# Patient Record
Sex: MALE | Race: WHITE
[De-identification: names, ages, dates, MRNs, and addresses within clinical notes are randomized per-mention and may not be internally consistent; named-entity substitution may affect disease eponyms.]

---

## 2017-05-01 ENCOUNTER — HOSPITAL ENCOUNTER (INPATIENT)
Dept: HOSPITAL 84 - D.MS | Age: 72
LOS: 2 days | Discharge: HOME | DRG: 39 | End: 2017-05-03
Attending: FAMILY MEDICINE | Admitting: FAMILY MEDICINE
Payer: MEDICARE

## 2017-05-01 VITALS
WEIGHT: 217 LBS | HEIGHT: 71 IN | WEIGHT: 217 LBS | BODY MASS INDEX: 30.38 KG/M2 | HEIGHT: 71 IN | BODY MASS INDEX: 30.38 KG/M2

## 2017-05-01 VITALS — DIASTOLIC BLOOD PRESSURE: 74 MMHG | SYSTOLIC BLOOD PRESSURE: 117 MMHG

## 2017-05-01 VITALS — DIASTOLIC BLOOD PRESSURE: 76 MMHG | SYSTOLIC BLOOD PRESSURE: 132 MMHG

## 2017-05-01 DIAGNOSIS — I10: ICD-10-CM

## 2017-05-01 DIAGNOSIS — E11.9: ICD-10-CM

## 2017-05-01 DIAGNOSIS — I25.119: ICD-10-CM

## 2017-05-01 DIAGNOSIS — Z86.73: ICD-10-CM

## 2017-05-01 DIAGNOSIS — I65.23: Primary | ICD-10-CM

## 2017-05-01 DIAGNOSIS — E78.5: ICD-10-CM

## 2017-05-01 LAB
ALBUMIN SERPL-MCNC: 4.3 G/DL (ref 3.4–5)
ALP SERPL-CCNC: 75 U/L (ref 46–116)
ALT SERPL-CCNC: 36 U/L (ref 10–68)
ANION GAP SERPL CALC-SCNC: 16.8 MMOL/L (ref 8–16)
APPEARANCE UR: CLEAR
BASOPHILS NFR BLD AUTO: 0.2 % (ref 0–2)
BILIRUB SERPL-MCNC: 0.43 MG/DL (ref 0.2–1.3)
BILIRUB SERPL-MCNC: NEGATIVE MG/DL
BUN SERPL-MCNC: 19 MG/DL (ref 7–18)
CALCIUM SERPL-MCNC: 9.6 MG/DL (ref 8.5–10.1)
CHLORIDE SERPL-SCNC: 103 MMOL/L (ref 98–107)
CO2 SERPL-SCNC: 24.3 MMOL/L (ref 21–32)
COLOR UR: YELLOW
CREAT SERPL-MCNC: 1 MG/DL (ref 0.6–1.3)
EOSINOPHIL NFR BLD: 2.9 % (ref 0–7)
ERYTHROCYTE [DISTWIDTH] IN BLOOD BY AUTOMATED COUNT: 13.1 % (ref 11.5–14.5)
GLOBULIN SER-MCNC: 2.9 G/L
GLUCOSE SERPL-MCNC: 1000 MG/DL
GLUCOSE SERPL-MCNC: 80 MG/DL (ref 74–106)
HCT VFR BLD CALC: 43.6 % (ref 42–54)
HGB BLD-MCNC: 14.6 G/DL (ref 13.5–17.5)
IMM GRANULOCYTES NFR BLD: 0.4 % (ref 0–5)
INR PPP: 0.96 (ref 0.85–1.17)
KETONES UR STRIP-MCNC: NEGATIVE MG/DL
LEUKOCYTE ESTERASE: NEGATIVE
LYMPHOCYTES NFR BLD AUTO: 23.9 % (ref 15–50)
MCH RBC QN AUTO: 31.1 PG (ref 26–34)
MCHC RBC AUTO-ENTMCNC: 33.5 G/DL (ref 31–37)
MCV RBC: 92.8 FL (ref 80–100)
MONOCYTES NFR BLD: 7.1 % (ref 2–11)
NEUTROPHILS NFR BLD AUTO: 65.5 % (ref 40–80)
NITRITE UR-MCNC: NEGATIVE MG/ML
OSMOLALITY SERPL CALC.SUM OF ELEC: 279 MOSM/KG (ref 275–300)
PH UR STRIP: 5 [PH] (ref 5–6)
PLATELET # BLD: 209 10X3/UL (ref 130–400)
PMV BLD AUTO: 10.1 FL (ref 7.4–10.4)
POTASSIUM SERPL-SCNC: 4.1 MMOL/L (ref 3.5–5.1)
PROT SERPL-MCNC: 7.2 G/DL (ref 6.4–8.2)
PROT UR-MCNC: NEGATIVE MG/DL
PROTHROMBIN TIME: 12.6 SECONDS (ref 11.6–15)
RBC # BLD AUTO: 4.7 10X6/UL (ref 4.2–6.1)
SODIUM SERPL-SCNC: 140 MMOL/L (ref 136–145)
SP GR UR STRIP: 1.01 (ref 1–1.02)
UROBILINOGEN UR-MCNC: NORMAL MG/DL
WBC # BLD AUTO: 10.2 10X3/UL (ref 4.8–10.8)

## 2017-05-01 NOTE — HP
PATIENT: LINDEN PRECIADO                                MEDICAL RECORD: [id number]
ACCOUNT: [id number]                                    LOCATION:D.MS [id number]
: 45                                            ADMISSION DATE: 17
                                                         
 
                             HISTORY AND PHYSICAL EXAMINATION
 
 
CHIEF COMPLAINT:  Slurred speech.
 
HISTORY OF PRESENT ILLNESS:  A 72-year-old white male patient of mine, who
presents for followup of recent TIA.  He has had a few of them now, most recent
one lasted only a few seconds a couple days ago in which he was talking to his
family and then all of a sudden, just could not talk and could not communicate
at all.  No facial drooping at that time; however, he has had it on previous
episodes.  We did an ultrasound of his carotid arteries last week, which showed
occlusion of the left internal carotid artery and some blockage in the right
external carotid artery.  At that time, we will order a CTA and a referral to CT
surgery; however, since he has had another episode over the weekend, we will
admit him today directly to the hospital.
 
REVIEW OF SYSTEMS:
CONSTITUTIONAL:  No fever or chills.
CARDIOVASCULAR:  No chest pain.
RESPIRATORY:  No cough or wheeze.
GASTROINTESTINAL:  No nausea, vomiting or diarrhea.
GENITOURINARY:  No dysuria.
NEUROLOGIC:  See HPI.
 
PAST MEDICAL HISTORY:
1.  Coronary artery disease.
2.  Hyperlipidemia.
3.  Hypertension.
4.  Type 2 diabetes.
5.  TIA.
 
PAST SURGICAL HISTORY:  Multiple rotator cuff surgeries on both shoulders, knee
surgery and coronary artery stent, last one in .
 
SOCIAL HISTORY:  The patient is , has 3 children, lives in [facility].  He is a  for large "[employer]s.  Nonsmoker.  Drinks alcohol
occasionally.
 
ALLERGIES:  PENICILLIN.
 
MEDICATIONS:  Glipizide 10 mg twice a day, atorvastatin 80 mg at night, Invokana
300 mg a day, metformin 1000 mg twice a day, Toprol-XL 50 mg a day, Ranexa 500
mg twice a day, valsartan with hydrochlorothiazide 160/25 daily, isosorbide
mononitrate 30 mg daily, aspirin 81 mg a day, Victoza as directed, and Plavix 75
mg a day.
 
PHYSICAL EXAMINATION:
VITAL SIGNS:  Height 5 feet 11 inches, weight 228.  Temperature 98.1, pulse 79,
O2 sat 94% on room air.
GENERAL:  No acute distress.
HEENT:  Normocephalic, atraumatic.
NECK:  Supple.
LUNGS:  Clear to auscultation bilaterally.
 
 
 
HISTORY AND PHYSICAL                           [id number]    LINDEN PRECIADO        
 
 
CARDIOVASCULAR:  Regular rate and rhythm.
GASTROINTESTINAL:  Soft, nontender to palpation.
NEUROLOGIC:  Awake, alert, oriented times 3.  Cranial nerves II-XII grossly
intact.
 
ASSESSMENT:
1.  Transient ischemic attack.
2.  Carotid artery stenosis.
3.  Hypertension.
4.  Coronary artery disease.
5.  Non-insulin-dependent diabetes.
6.  History of benign cerebral meningioma.
 
PLAN:  We will admit the patient to the hospital.  Get CTA of the carotids and
consult  ____.
 
TRANSINT:[id number] Voice Confirmation ID: [id number] DOCUMENT ID: [id number]
 
 
                                           
                                           BK JOHNSON MD          
 
 
 
 
 
 
 
 
 
 
 
 
 
 
 
 
 
 
 
 
 
 
 
 
 
 
CC:                                                             [id number]
DICTATION DATE: 17 1431     :     17 1529      ADM IN  
                                                                              
[address] [place] [address]

## 2017-05-01 NOTE — OP
PATIENT NAME:  LINDEN PRECIADO                          MEDICAL RECORD: [id number]
:45                                             LOCATION:[facility]    D.CV08
                                                         ADMISSION DATE:17
SURGEON:  LUIS FERNANDO PENNINGTON MD             
 
 
DATE OF OPERATION:  2017
 
SURGEON:  Luis Fernando Pennington MD.
 
ANESTHESIA:  General endotracheal, Dr. Bean.
 
OPERATION PERFORMED:  Left carotid endarterectomy with patch angioplasty.
 
PREOPERATIVE DIAGNOSIS:  Critical left internal carotid artery stenosis.
 
POSTOPERATIVE DIAGNOSIS:  Critical left internal carotid artery stenosis.
 
INDICATION FOR OPERATION:  Critical left internal carotid artery stenosis.
 
FINDINGS AT OPERATION:  Critical left internal carotid artery stenosis.  There
were no EEG changes with clamping or unclamping of the carotid artery.
 
ESTIMATED BLOOD LOSS:  Less than 100 mL.
 
DESCRIPTION OF PROCEDURE:  After informed consent, adequate preoperative
medication and evaluation, the patient was brought to the operating room, placed
on table in supine position.  After induction of general endotracheal anesthesia
and application of appropriate monitoring devices, the left neck was prepped and
draped in a sterile field, utilizing Betadine scrub, alcohol, and Betadine
solution.  A Betadine-impregnated drape was also used.  An oblique incision was
made in the skin crease.  Dissection was carried down the fascia.  Hemostasis
maintained with electrocautery.  Facial vein was identified and divided. 
Utilizing sharp dissection, the common carotid, internal and external carotid
arteries were dissected free from surrounding structures, protecting the
neurological structures.  The patient was given a calculated dose of heparin. 
After 3 minutes, clamps were applied.  After 2 minutes, no EEG changes.  The
arteriotomy was made and extended with Pompa scissors.  Artery underwent
endarterectomy sharply.  Artery underwent extensive debridement and irrigation. 
Utilizing a vascular patch and running 7-0 Prolene suture, the arteriotomy was
closed with patch angioplasty technique.  All maneuvers to remove trapped air
were performed.  The clamps were removed sequentially.  There were no EEG
changes.  The patient was given a calculated dose of protamine to reverse the
heparin.  Hemostasis was achieved.  A #7 Vikas-Guevara drain was left in the
depth of wound and brought to the base of the neck.  Neck was again irrigated. 
Instrument count and sponge count were correct times 2.  Neck was closed in
layers utilizing 3-0 Vicryl on the platysma, 5-0 subcuticular Monocryl on the
skin.  Sterile dressings were applied.  The patient tolerated the procedure well
and was transferred to the CV ICU in satisfactory condition.
 
TRANSINT:[id number] Voice Confirmation ID: [id number] DOCUMENT ID: [id number]
                                           
 
 
 
OPERATIVE REPORT                               [id number]    LINDEN PRECIADO EDWARD MD             
 
 
 
 
 
 
 
 
 
 
 
 
 
 
 
 
 
 
 
 
 
 
 
 
 
 
 
 
 
 
 
 
 
 
 
 
 
 
 
 
 
 
 
 
 
 
CC:                                                             [id number]
DICTATION DATE: 17 1045     :     17 1114      ADM IN  
                                                                              
[id number] [place] [address]

## 2017-05-02 VITALS — DIASTOLIC BLOOD PRESSURE: 49 MMHG | SYSTOLIC BLOOD PRESSURE: 115 MMHG

## 2017-05-02 VITALS — SYSTOLIC BLOOD PRESSURE: 111 MMHG | DIASTOLIC BLOOD PRESSURE: 48 MMHG

## 2017-05-02 VITALS — DIASTOLIC BLOOD PRESSURE: 60 MMHG | SYSTOLIC BLOOD PRESSURE: 129 MMHG

## 2017-05-02 VITALS — SYSTOLIC BLOOD PRESSURE: 122 MMHG | DIASTOLIC BLOOD PRESSURE: 55 MMHG

## 2017-05-02 VITALS — DIASTOLIC BLOOD PRESSURE: 59 MMHG | SYSTOLIC BLOOD PRESSURE: 124 MMHG

## 2017-05-02 VITALS — DIASTOLIC BLOOD PRESSURE: 51 MMHG | SYSTOLIC BLOOD PRESSURE: 122 MMHG

## 2017-05-02 VITALS — DIASTOLIC BLOOD PRESSURE: 81 MMHG | SYSTOLIC BLOOD PRESSURE: 160 MMHG

## 2017-05-02 VITALS — SYSTOLIC BLOOD PRESSURE: 116 MMHG | DIASTOLIC BLOOD PRESSURE: 51 MMHG

## 2017-05-02 VITALS — DIASTOLIC BLOOD PRESSURE: 59 MMHG | SYSTOLIC BLOOD PRESSURE: 132 MMHG

## 2017-05-02 VITALS — SYSTOLIC BLOOD PRESSURE: 131 MMHG | DIASTOLIC BLOOD PRESSURE: 57 MMHG

## 2017-05-02 VITALS — DIASTOLIC BLOOD PRESSURE: 51 MMHG | SYSTOLIC BLOOD PRESSURE: 118 MMHG

## 2017-05-02 VITALS — SYSTOLIC BLOOD PRESSURE: 112 MMHG | DIASTOLIC BLOOD PRESSURE: 49 MMHG

## 2017-05-02 VITALS — SYSTOLIC BLOOD PRESSURE: 135 MMHG | DIASTOLIC BLOOD PRESSURE: 55 MMHG

## 2017-05-02 VITALS — SYSTOLIC BLOOD PRESSURE: 130 MMHG | DIASTOLIC BLOOD PRESSURE: 53 MMHG

## 2017-05-02 VITALS — DIASTOLIC BLOOD PRESSURE: 56 MMHG | SYSTOLIC BLOOD PRESSURE: 103 MMHG

## 2017-05-02 VITALS — SYSTOLIC BLOOD PRESSURE: 139 MMHG | DIASTOLIC BLOOD PRESSURE: 55 MMHG

## 2017-05-02 VITALS — SYSTOLIC BLOOD PRESSURE: 133 MMHG | DIASTOLIC BLOOD PRESSURE: 63 MMHG

## 2017-05-02 VITALS — SYSTOLIC BLOOD PRESSURE: 147 MMHG | DIASTOLIC BLOOD PRESSURE: 62 MMHG

## 2017-05-02 VITALS — SYSTOLIC BLOOD PRESSURE: 113 MMHG | DIASTOLIC BLOOD PRESSURE: 55 MMHG

## 2017-05-02 VITALS — DIASTOLIC BLOOD PRESSURE: 50 MMHG | SYSTOLIC BLOOD PRESSURE: 117 MMHG

## 2017-05-02 VITALS — DIASTOLIC BLOOD PRESSURE: 53 MMHG | SYSTOLIC BLOOD PRESSURE: 122 MMHG

## 2017-05-02 VITALS — SYSTOLIC BLOOD PRESSURE: 126 MMHG | DIASTOLIC BLOOD PRESSURE: 52 MMHG

## 2017-05-02 VITALS — SYSTOLIC BLOOD PRESSURE: 121 MMHG | DIASTOLIC BLOOD PRESSURE: 50 MMHG

## 2017-05-02 VITALS — DIASTOLIC BLOOD PRESSURE: 55 MMHG | SYSTOLIC BLOOD PRESSURE: 130 MMHG

## 2017-05-02 VITALS — DIASTOLIC BLOOD PRESSURE: 59 MMHG | SYSTOLIC BLOOD PRESSURE: 122 MMHG

## 2017-05-02 VITALS — SYSTOLIC BLOOD PRESSURE: 125 MMHG | DIASTOLIC BLOOD PRESSURE: 53 MMHG

## 2017-05-02 VITALS — SYSTOLIC BLOOD PRESSURE: 124 MMHG | DIASTOLIC BLOOD PRESSURE: 51 MMHG

## 2017-05-02 VITALS — SYSTOLIC BLOOD PRESSURE: 125 MMHG | DIASTOLIC BLOOD PRESSURE: 54 MMHG

## 2017-05-02 VITALS — SYSTOLIC BLOOD PRESSURE: 122 MMHG | DIASTOLIC BLOOD PRESSURE: 56 MMHG

## 2017-05-02 VITALS — DIASTOLIC BLOOD PRESSURE: 60 MMHG | SYSTOLIC BLOOD PRESSURE: 123 MMHG

## 2017-05-02 VITALS — SYSTOLIC BLOOD PRESSURE: 127 MMHG | DIASTOLIC BLOOD PRESSURE: 53 MMHG

## 2017-05-02 VITALS — SYSTOLIC BLOOD PRESSURE: 128 MMHG | DIASTOLIC BLOOD PRESSURE: 59 MMHG

## 2017-05-02 VITALS — DIASTOLIC BLOOD PRESSURE: 50 MMHG | SYSTOLIC BLOOD PRESSURE: 121 MMHG

## 2017-05-02 VITALS — SYSTOLIC BLOOD PRESSURE: 130 MMHG | DIASTOLIC BLOOD PRESSURE: 59 MMHG

## 2017-05-02 VITALS — DIASTOLIC BLOOD PRESSURE: 50 MMHG | SYSTOLIC BLOOD PRESSURE: 119 MMHG

## 2017-05-02 VITALS — SYSTOLIC BLOOD PRESSURE: 124 MMHG | DIASTOLIC BLOOD PRESSURE: 57 MMHG

## 2017-05-02 LAB
ALBUMIN SERPL-MCNC: 3.7 G/DL (ref 3.4–5)
ALP SERPL-CCNC: 58 U/L (ref 46–116)
ALT SERPL-CCNC: 37 U/L (ref 10–68)
ANION GAP SERPL CALC-SCNC: 14.8 MMOL/L (ref 8–16)
BASOPHILS NFR BLD AUTO: 0.1 % (ref 0–2)
BILIRUB SERPL-MCNC: 0.5 MG/DL (ref 0.2–1.3)
BUN SERPL-MCNC: 15 MG/DL (ref 7–18)
CALCIUM SERPL-MCNC: 8.8 MG/DL (ref 8.5–10.1)
CHLORIDE SERPL-SCNC: 103 MMOL/L (ref 98–107)
CHOLEST/HDLC SERPL: 3.9 RATIO (ref 2.3–4.9)
CO2 SERPL-SCNC: 25.4 MMOL/L (ref 21–32)
CREAT SERPL-MCNC: 0.9 MG/DL (ref 0.6–1.3)
EOSINOPHIL NFR BLD: 3.5 % (ref 0–7)
ERYTHROCYTE [DISTWIDTH] IN BLOOD BY AUTOMATED COUNT: 13.2 % (ref 11.5–14.5)
GLOBULIN SER-MCNC: 2.9 G/L
GLUCOSE SERPL-MCNC: 158 MG/DL (ref 74–106)
HCT VFR BLD CALC: 42.4 % (ref 42–54)
HDLC SERPL-MCNC: 33 MG/DL (ref 32–96)
HGB BLD-MCNC: 14.1 G/DL (ref 13.5–17.5)
IMM GRANULOCYTES NFR BLD: 0.4 % (ref 0–5)
LDLC SERPL-MCNC: (no result) MG/DL (ref 0–100)
LYMPHOCYTES NFR BLD AUTO: 22.1 % (ref 15–50)
MCH RBC QN AUTO: 30.9 PG (ref 26–34)
MCHC RBC AUTO-ENTMCNC: 33.3 G/DL (ref 31–37)
MCV RBC: 92.8 FL (ref 80–100)
MONOCYTES NFR BLD: 5.8 % (ref 2–11)
NEUTROPHILS NFR BLD AUTO: 68.1 % (ref 40–80)
OSMOLALITY SERPL CALC.SUM OF ELEC: 281 MOSM/KG (ref 275–300)
PLATELET # BLD: 181 10X3/UL (ref 130–400)
PMV BLD AUTO: 10.1 FL (ref 7.4–10.4)
POTASSIUM SERPL-SCNC: 4.2 MMOL/L (ref 3.5–5.1)
PROT SERPL-MCNC: 6.6 G/DL (ref 6.4–8.2)
RBC # BLD AUTO: 4.57 10X6/UL (ref 4.2–6.1)
SODIUM SERPL-SCNC: 139 MMOL/L (ref 136–145)
TRIGL SERPL-MCNC: 519 MG/DL (ref 30–200)
WBC # BLD AUTO: 7.4 10X3/UL (ref 4.8–10.8)

## 2017-05-02 PROCEDURE — 03UL0KZ SUPPLEMENT LEFT INTERNAL CAROTID ARTERY WITH NONAUTOLOGOUS TISSUE SUBSTITUTE, OPEN APPROACH: ICD-10-PCS | Performed by: THORACIC SURGERY (CARDIOTHORACIC VASCULAR SURGERY)

## 2017-05-02 PROCEDURE — 03CL0ZZ EXTIRPATION OF MATTER FROM LEFT INTERNAL CAROTID ARTERY, OPEN APPROACH: ICD-10-PCS | Performed by: THORACIC SURGERY (CARDIOTHORACIC VASCULAR SURGERY)

## 2017-05-02 NOTE — NUR
REPORT RECEIVED AND ASSESSMENT COMPLETED. SEE FLOWSHEET FOR FULL DETAILS. PT
DENIES PAIN, AND HAS NO SIGNS OF SWELLING OR HEMATOMA. VSS. WILL MONITOR FOR
CHANGES THROUGHOUT SHIFT

## 2017-05-02 NOTE — NUR
PLACED FRESH ICE IN ICE PACK TO PLACE OVER INSICION SITE. PT DENIES NEEDS AT
THIS TIME. CALL LIGHT IN REACH. BED IN LOW POSITION. WILL CONT TO ASSESS.

## 2017-05-02 NOTE — NUR
PATIENT RESTING IN BED WITH EYES CLOSED. NO VISBLE SIGNS OF DISTRESS. BED IN
LOWEST POSITION AND CALL LIGHT WITHIN REACH.

## 2017-05-03 VITALS — SYSTOLIC BLOOD PRESSURE: 102 MMHG | DIASTOLIC BLOOD PRESSURE: 68 MMHG

## 2017-05-03 VITALS — DIASTOLIC BLOOD PRESSURE: 56 MMHG | SYSTOLIC BLOOD PRESSURE: 129 MMHG

## 2017-05-03 VITALS — SYSTOLIC BLOOD PRESSURE: 102 MMHG | DIASTOLIC BLOOD PRESSURE: 64 MMHG

## 2017-05-03 VITALS — DIASTOLIC BLOOD PRESSURE: 51 MMHG | SYSTOLIC BLOOD PRESSURE: 115 MMHG

## 2017-05-03 VITALS — SYSTOLIC BLOOD PRESSURE: 135 MMHG | DIASTOLIC BLOOD PRESSURE: 56 MMHG

## 2017-05-03 VITALS — SYSTOLIC BLOOD PRESSURE: 142 MMHG | DIASTOLIC BLOOD PRESSURE: 54 MMHG

## 2017-05-03 VITALS — DIASTOLIC BLOOD PRESSURE: 62 MMHG | SYSTOLIC BLOOD PRESSURE: 101 MMHG

## 2017-05-03 VITALS — DIASTOLIC BLOOD PRESSURE: 50 MMHG | SYSTOLIC BLOOD PRESSURE: 121 MMHG

## 2017-05-03 VITALS — DIASTOLIC BLOOD PRESSURE: 53 MMHG | SYSTOLIC BLOOD PRESSURE: 121 MMHG

## 2017-05-03 VITALS — SYSTOLIC BLOOD PRESSURE: 111 MMHG | DIASTOLIC BLOOD PRESSURE: 48 MMHG

## 2017-05-03 VITALS — SYSTOLIC BLOOD PRESSURE: 114 MMHG | DIASTOLIC BLOOD PRESSURE: 51 MMHG

## 2017-05-03 VITALS — SYSTOLIC BLOOD PRESSURE: 131 MMHG | DIASTOLIC BLOOD PRESSURE: 51 MMHG

## 2017-05-03 VITALS — SYSTOLIC BLOOD PRESSURE: 106 MMHG | DIASTOLIC BLOOD PRESSURE: 47 MMHG

## 2017-05-03 VITALS — SYSTOLIC BLOOD PRESSURE: 140 MMHG | DIASTOLIC BLOOD PRESSURE: 56 MMHG

## 2017-05-03 VITALS — DIASTOLIC BLOOD PRESSURE: 47 MMHG | SYSTOLIC BLOOD PRESSURE: 98 MMHG

## 2017-05-03 VITALS — SYSTOLIC BLOOD PRESSURE: 129 MMHG | DIASTOLIC BLOOD PRESSURE: 54 MMHG

## 2017-05-03 VITALS — DIASTOLIC BLOOD PRESSURE: 52 MMHG | SYSTOLIC BLOOD PRESSURE: 128 MMHG

## 2017-05-03 VITALS — DIASTOLIC BLOOD PRESSURE: 54 MMHG | SYSTOLIC BLOOD PRESSURE: 132 MMHG

## 2017-05-03 VITALS — DIASTOLIC BLOOD PRESSURE: 57 MMHG | SYSTOLIC BLOOD PRESSURE: 134 MMHG

## 2017-05-03 VITALS — SYSTOLIC BLOOD PRESSURE: 128 MMHG | DIASTOLIC BLOOD PRESSURE: 51 MMHG

## 2017-05-03 VITALS — DIASTOLIC BLOOD PRESSURE: 54 MMHG | SYSTOLIC BLOOD PRESSURE: 124 MMHG

## 2017-05-03 VITALS — SYSTOLIC BLOOD PRESSURE: 134 MMHG | DIASTOLIC BLOOD PRESSURE: 55 MMHG

## 2017-05-03 VITALS — SYSTOLIC BLOOD PRESSURE: 123 MMHG | DIASTOLIC BLOOD PRESSURE: 51 MMHG

## 2017-05-03 VITALS — SYSTOLIC BLOOD PRESSURE: 121 MMHG | DIASTOLIC BLOOD PRESSURE: 50 MMHG

## 2017-05-03 VITALS — DIASTOLIC BLOOD PRESSURE: 54 MMHG | SYSTOLIC BLOOD PRESSURE: 129 MMHG

## 2017-05-03 VITALS — DIASTOLIC BLOOD PRESSURE: 65 MMHG | SYSTOLIC BLOOD PRESSURE: 147 MMHG

## 2017-05-03 VITALS — SYSTOLIC BLOOD PRESSURE: 137 MMHG | DIASTOLIC BLOOD PRESSURE: 53 MMHG

## 2017-05-03 VITALS — DIASTOLIC BLOOD PRESSURE: 50 MMHG | SYSTOLIC BLOOD PRESSURE: 114 MMHG

## 2017-05-03 VITALS — SYSTOLIC BLOOD PRESSURE: 119 MMHG | DIASTOLIC BLOOD PRESSURE: 64 MMHG

## 2017-05-03 VITALS — SYSTOLIC BLOOD PRESSURE: 136 MMHG | DIASTOLIC BLOOD PRESSURE: 57 MMHG

## 2017-05-03 LAB
ALBUMIN SERPL-MCNC: 3.8 G/DL (ref 3.4–5)
ALP SERPL-CCNC: 51 U/L (ref 46–116)
ALT SERPL-CCNC: 33 U/L (ref 10–68)
ANION GAP SERPL CALC-SCNC: 15.7 MMOL/L (ref 8–16)
BASOPHILS NFR BLD AUTO: 0.2 % (ref 0–2)
BILIRUB SERPL-MCNC: 0.76 MG/DL (ref 0.2–1.3)
BUN SERPL-MCNC: 11 MG/DL (ref 7–18)
CALCIUM SERPL-MCNC: 8.7 MG/DL (ref 8.5–10.1)
CHLORIDE SERPL-SCNC: 101 MMOL/L (ref 98–107)
CO2 SERPL-SCNC: 25.2 MMOL/L (ref 21–32)
CREAT SERPL-MCNC: 1 MG/DL (ref 0.6–1.3)
EOSINOPHIL NFR BLD: 1.9 % (ref 0–7)
ERYTHROCYTE [DISTWIDTH] IN BLOOD BY AUTOMATED COUNT: 13.1 % (ref 11.5–14.5)
GLOBULIN SER-MCNC: 3.1 G/L
GLUCOSE SERPL-MCNC: 129 MG/DL (ref 74–106)
HCT VFR BLD CALC: 42 % (ref 42–54)
HGB BLD-MCNC: 13.9 G/DL (ref 13.5–17.5)
IMM GRANULOCYTES NFR BLD: 0.3 % (ref 0–5)
LYMPHOCYTES NFR BLD AUTO: 10.6 % (ref 15–50)
MCH RBC QN AUTO: 30.6 PG (ref 26–34)
MCHC RBC AUTO-ENTMCNC: 33.1 G/DL (ref 31–37)
MCV RBC: 92.5 FL (ref 80–100)
MONOCYTES NFR BLD: 5.8 % (ref 2–11)
NEUTROPHILS NFR BLD AUTO: 81.2 % (ref 40–80)
OSMOLALITY SERPL CALC.SUM OF ELEC: 276 MOSM/KG (ref 275–300)
PLATELET # BLD: 201 10X3/UL (ref 130–400)
PMV BLD AUTO: 9.6 FL (ref 7.4–10.4)
POTASSIUM SERPL-SCNC: 3.9 MMOL/L (ref 3.5–5.1)
PROT SERPL-MCNC: 6.9 G/DL (ref 6.4–8.2)
RBC # BLD AUTO: 4.54 10X6/UL (ref 4.2–6.1)
SODIUM SERPL-SCNC: 138 MMOL/L (ref 136–145)
WBC # BLD AUTO: 11.6 10X3/UL (ref 4.8–10.8)

## 2017-05-03 NOTE — NUR
DISCHARGE INSTRUCTIONS REVIWED WITH PT. QUESTIONS ANSWERED. RIGHT DL SC
REMOVED PER ORDERS. CATH INTACT. OPSITE DRESSING PLACED OVER SITE. WHEELED OUT
FOR TRANSPORTAION.

## 2017-05-03 NOTE — NUR
DR. PENNINGTON AT BEDSIDE. STATED TO REMOVE A-LINE AND ORTEGA CATHETER. REMOVED PER
ORDERS WITH NO ISSUES NOTED. URINAL PLACED IN REACH. OPSITE DRESSING PLACED
OVER RIGHT WRIST. WILL MONITOR.

## 2017-05-03 NOTE — NUR
REPORT RECIEVED FROM NIGHT SHIFT NURSE. PT RESTING IN BED QUIETLY. NO S/SX OF
ACUTE DISTRESS NOTED AT THIS TIME. ASSISTED UP IN BED. BREAKFAST TRAY PLACED
ON BEDSIDE TABLE. REFRESHED ICE WATER. CALL LIGHT IN REACH. BED IN LOW
POSITION. FULL ASSESSMENT COMPLETE PER FLOWSHEET. WILL CONT TO ASSESS FOR
CHANGES THROUGHOUT SHIFT.

## 2017-05-03 NOTE — NUR
ITEMS PLACED IN BATHROOM FOR PT TO GIVE HIMSELF A BATH. DENIED ASSISTANCE.
INTRUCTED TO PULL CALL LIGHT STRING IN BATHROOM IF THERE WAS ANYTHING HE
NEEDED. LINEN CHANGE PROVIDED. TOOTH BRUSH AND TOOTH PASTE PROVIDED.

## 2017-06-02 ENCOUNTER — HOSPITAL ENCOUNTER (OUTPATIENT)
Dept: HOSPITAL 84 - D.ECHO | Age: 72
Discharge: HOME | End: 2017-06-02
Attending: ORTHOPAEDIC SURGERY
Payer: OTHER GOVERNMENT

## 2017-06-02 VITALS — BODY MASS INDEX: 29.7 KG/M2

## 2017-06-02 DIAGNOSIS — I25.10: Primary | ICD-10-CM

## 2017-07-24 ENCOUNTER — HOSPITAL ENCOUNTER (INPATIENT)
Dept: HOSPITAL 84 - D.M2 | Age: 72
LOS: 3 days | Discharge: HOME | DRG: 69 | End: 2017-07-27
Attending: FAMILY MEDICINE | Admitting: FAMILY MEDICINE
Payer: MEDICARE

## 2017-07-24 VITALS
WEIGHT: 212.65 LBS | BODY MASS INDEX: 29.77 KG/M2 | WEIGHT: 212.65 LBS | HEIGHT: 71 IN | BODY MASS INDEX: 29.77 KG/M2 | BODY MASS INDEX: 29.77 KG/M2 | HEIGHT: 71 IN

## 2017-07-24 VITALS — SYSTOLIC BLOOD PRESSURE: 139 MMHG | DIASTOLIC BLOOD PRESSURE: 72 MMHG

## 2017-07-24 DIAGNOSIS — E11.65: ICD-10-CM

## 2017-07-24 DIAGNOSIS — I25.119: ICD-10-CM

## 2017-07-24 DIAGNOSIS — I10: ICD-10-CM

## 2017-07-24 DIAGNOSIS — G40.109: ICD-10-CM

## 2017-07-24 DIAGNOSIS — I08.1: ICD-10-CM

## 2017-07-24 DIAGNOSIS — R47.01: ICD-10-CM

## 2017-07-24 DIAGNOSIS — G45.9: Primary | ICD-10-CM

## 2017-07-24 DIAGNOSIS — Z95.5: ICD-10-CM

## 2017-07-24 DIAGNOSIS — I65.23: ICD-10-CM

## 2017-07-24 DIAGNOSIS — D32.0: ICD-10-CM

## 2017-07-24 DIAGNOSIS — Z86.73: ICD-10-CM

## 2017-07-24 LAB
ALBUMIN SERPL-MCNC: 3.5 G/DL (ref 3.4–5)
ALP SERPL-CCNC: 69 U/L (ref 46–116)
ALT SERPL-CCNC: 25 U/L (ref 10–68)
ANION GAP SERPL CALC-SCNC: 12.4 MMOL/L (ref 8–16)
APTT BLD: 27.3 SECONDS (ref 22.8–39.4)
BASOPHILS NFR BLD AUTO: 0.4 % (ref 0–2)
BILIRUB SERPL-MCNC: 0.3 MG/DL (ref 0.2–1.3)
BUN SERPL-MCNC: 25 MG/DL (ref 7–18)
CALCIUM SERPL-MCNC: 9.1 MG/DL (ref 8.5–10.1)
CHLORIDE SERPL-SCNC: 102 MMOL/L (ref 98–107)
CO2 SERPL-SCNC: 29.2 MMOL/L (ref 21–32)
CREAT SERPL-MCNC: 1.1 MG/DL (ref 0.6–1.3)
EOSINOPHIL NFR BLD: 4.6 % (ref 0–7)
ERYTHROCYTE [DISTWIDTH] IN BLOOD BY AUTOMATED COUNT: 12.7 % (ref 11.5–14.5)
GLOBULIN SER-MCNC: 2.8 G/L
GLUCOSE SERPL-MCNC: 263 MG/DL (ref 74–106)
HCT VFR BLD CALC: 37 % (ref 42–54)
HGB BLD-MCNC: 12.5 G/DL (ref 13.5–17.5)
IMM GRANULOCYTES NFR BLD: 0.3 % (ref 0–5)
INR PPP: 0.96 (ref 0.85–1.17)
LYMPHOCYTES NFR BLD AUTO: 29.6 % (ref 15–50)
MCH RBC QN AUTO: 30.7 PG (ref 26–34)
MCHC RBC AUTO-ENTMCNC: 33.8 G/DL (ref 31–37)
MCV RBC: 90.9 FL (ref 80–100)
MONOCYTES NFR BLD: 5.1 % (ref 2–11)
NEUTROPHILS NFR BLD AUTO: 60 % (ref 40–80)
OSMOLALITY SERPL CALC.SUM OF ELEC: 291 MOSM/KG (ref 275–300)
PLATELET # BLD: 179 10X3/UL (ref 130–400)
PMV BLD AUTO: 9.8 FL (ref 7.4–10.4)
POTASSIUM SERPL-SCNC: 3.6 MMOL/L (ref 3.5–5.1)
PROT SERPL-MCNC: 6.3 G/DL (ref 6.4–8.2)
PROTHROMBIN TIME: 12.7 SECONDS (ref 11.6–15)
RBC # BLD AUTO: 4.07 10X6/UL (ref 4.2–6.1)
SODIUM SERPL-SCNC: 140 MMOL/L (ref 136–145)
WBC # BLD AUTO: 7.2 10X3/UL (ref 4.8–10.8)

## 2017-07-24 NOTE — NUR
ADMIT TO ROOM 2110 DIRECT ADMIT FROM MD OFFICE FOR ARRHYTHMIA. AMBULATORY AND
VOICING NO PAIN OR DISCOMFORT. ACCOMPANIED BY WIFE. SEE ADMISSION ASSESSMENT
AND HISTORY. HOME MEDS REVIEWED. INITIATE PLAN OF CARE.

## 2017-07-24 NOTE — NUR
PHONE CALL TO DR PENNINGTON TO NOTIFY OF CONSULT. HE WAS UNAWARE THAT PT WAS BEING
ADMITTED. WILL IMPLEMENT OTHER ORDERS SENT WITH PATIENT FROM DR GARCIATIONS
OFFICE.
 
SPOKE WITH RADIOLOGY. WILL DO CXR TONIGHT AND SCHEDULE PT FOR CT OF HEAD AND
CAROTID US FOR TOMORROW AFTER MD'S HAVE HAD A CHANCE TO CONFER ON PLAN OF
CARE.

## 2017-07-24 NOTE — EEG
PATIENT:LINDEN PRECIADO                DATE OF SERVICE: 07/26/17
                                               MEDICAL RECORD: [id number]
DATE OF BIRTH: [birth year]                        LOCATION:D.211    D.M2 
                                               ADMISSION DATE: 07/26/17
REFERRING PHYSICIAN:                               
 
INTERPRETING PHYSICIAN: STEPHANY NATH MD            
 
 
DATE OF SERVICE:  07/26/2017
 
Electroencephalographic Report
 
REFERRED BY:  Dr. Maldonado as an inpatient, currently in room 2119.
 
ELECTROENCEPHALOGRAM NUMBER:  [id number].
 
DATE OF EXAMINATION:  07/26/2017 at 3:00 p.m.
 
TECHNICAL DATA:  This electroencephalographic recording consists of
approximately 20 minutes of data collection utilizing the international 10/20
system of electrode placement and both referential and non-referential montages.
 Sixteen channels of electrocerebral recording are accompanied by a 17th channel
dedicated to the electrocardiographic rhythm and 2 channels of electromyographic
recording.  Recording is performed in the awake and drowsy states utilizing
activation by photic stimulation.
 
ELECTROENCEPHALOGRAPHIC DATA:  The awake state comprises approximately 30% of
the recorded electrocerebral activity.  Electromyographic artifact is prominent
and rapid eye movements are seen.  The posterior dominant background consists of
a symmetric semi-arrhythmic waxing and waning 8-9 Hz alpha activity, which is
suppressed by eye opening.
 
The drowsy state comprises the remaining portion of the recorded electrocerebral
activity.  Electromyographic artifact is diminished and rapid eye movements are
not seen.  The posterior dominant background is relatively suppressed.  Also
seen is a semi-arrhythmic, intermittent 5-6 Hz theta activity, which occurs for
periods of 1-2 seconds approximately once every 2-3 pages.  This is generalized,
diffuse and symmetric in distribution.
 
No abnormal nor focal slowing is identified.  No epileptiform discharges are
seen.  Photic stimulation induces no abnormal change in the recorded
electrocerebral activity.
 
INTERPRETATION:  Normal (awake and drowsy).
 
This is a normal electroencephalographic recording.
 
TRANSINT:[id number] Voice Confirmation ID: [id number] DOCUMENT ID: [id number]
 
 
                                           
 
 
 
ELECTROENCEPHALOGRAM REPORT                    [id number]    LINDEN PRECIADO        
 
 
                                           STEPHANY NATH MD            
 
 
 
 
 
 
 
 
 
 
 
 
 
 
 
 
 
 
 
 
 
 
 
 
 
 
 
 
 
 
 
 
 
 
 
 
 
 
 
 
 
 
 
 
 
 
CC:                                                             [id number]
DICTATION DATE: 07/27/17 0728     :     07/27/17 0833      ADM IN  
                                                                              
[facility]                                          
[address] [place] [address]

## 2017-07-25 VITALS — SYSTOLIC BLOOD PRESSURE: 120 MMHG | DIASTOLIC BLOOD PRESSURE: 70 MMHG

## 2017-07-25 VITALS — SYSTOLIC BLOOD PRESSURE: 141 MMHG | DIASTOLIC BLOOD PRESSURE: 79 MMHG

## 2017-07-25 VITALS — SYSTOLIC BLOOD PRESSURE: 146 MMHG | DIASTOLIC BLOOD PRESSURE: 79 MMHG

## 2017-07-25 VITALS — SYSTOLIC BLOOD PRESSURE: 96 MMHG | DIASTOLIC BLOOD PRESSURE: 63 MMHG

## 2017-07-25 VITALS — DIASTOLIC BLOOD PRESSURE: 78 MMHG | SYSTOLIC BLOOD PRESSURE: 148 MMHG

## 2017-07-25 VITALS — DIASTOLIC BLOOD PRESSURE: 83 MMHG | SYSTOLIC BLOOD PRESSURE: 145 MMHG

## 2017-07-25 LAB
APPEARANCE UR: (no result)
BACTERIA #/AREA URNS HPF: (no result) /HPF
BILIRUB SERPL-MCNC: NEGATIVE MG/DL
COLOR UR: YELLOW
GLUCOSE SERPL-MCNC: 1000 MG/DL
KETONES UR STRIP-MCNC: NEGATIVE MG/DL
LEUKOCYTE ESTERASE: (no result)
NITRITE UR-MCNC: NEGATIVE MG/ML
PH UR STRIP: 8 [PH] (ref 5–6)
PROT UR-MCNC: NEGATIVE MG/DL
RBC #/AREA URNS HPF: (no result) /HPF (ref 0–5)
SP GR UR STRIP: 1.01 (ref 1–1.02)
UROBILINOGEN UR-MCNC: NORMAL MG/DL
WBC #/AREA URNS HPF: >50 /HPF (ref 0–5)

## 2017-07-26 VITALS — SYSTOLIC BLOOD PRESSURE: 104 MMHG | DIASTOLIC BLOOD PRESSURE: 54 MMHG

## 2017-07-26 VITALS — DIASTOLIC BLOOD PRESSURE: 87 MMHG | SYSTOLIC BLOOD PRESSURE: 168 MMHG

## 2017-07-26 VITALS — SYSTOLIC BLOOD PRESSURE: 117 MMHG | DIASTOLIC BLOOD PRESSURE: 77 MMHG

## 2017-07-26 VITALS — SYSTOLIC BLOOD PRESSURE: 158 MMHG | DIASTOLIC BLOOD PRESSURE: 90 MMHG

## 2017-07-26 VITALS — DIASTOLIC BLOOD PRESSURE: 84 MMHG | SYSTOLIC BLOOD PRESSURE: 169 MMHG

## 2017-07-26 LAB
ANION GAP SERPL CALC-SCNC: 13.2 MMOL/L (ref 8–16)
BASOPHILS NFR BLD AUTO: 0.2 % (ref 0–2)
BUN SERPL-MCNC: 19 MG/DL (ref 7–18)
CALCIUM SERPL-MCNC: 9.3 MG/DL (ref 8.5–10.1)
CHLORIDE SERPL-SCNC: 103 MMOL/L (ref 98–107)
CO2 SERPL-SCNC: 28.8 MMOL/L (ref 21–32)
CREAT SERPL-MCNC: 0.9 MG/DL (ref 0.6–1.3)
EOSINOPHIL NFR BLD: 4 % (ref 0–7)
ERYTHROCYTE [DISTWIDTH] IN BLOOD BY AUTOMATED COUNT: 12.9 % (ref 11.5–14.5)
GLUCOSE SERPL-MCNC: 150 MG/DL (ref 74–106)
HCT VFR BLD CALC: 41.7 % (ref 42–54)
HGB BLD-MCNC: 14 G/DL (ref 13.5–17.5)
IMM GRANULOCYTES NFR BLD: 0.3 % (ref 0–5)
INR PPP: 0.96 (ref 0.85–1.17)
LYMPHOCYTES NFR BLD AUTO: 19.5 % (ref 15–50)
MCH RBC QN AUTO: 30.4 PG (ref 26–34)
MCHC RBC AUTO-ENTMCNC: 33.6 G/DL (ref 31–37)
MCV RBC: 90.7 FL (ref 80–100)
MONOCYTES NFR BLD: 5.3 % (ref 2–11)
NEUTROPHILS NFR BLD AUTO: 70.7 % (ref 40–80)
OSMOLALITY SERPL CALC.SUM OF ELEC: 285 MOSM/KG (ref 275–300)
PA ADP PRP-ACNC: 169 PRU (ref 194–418)
PLATELET # BLD: 197 10X3/UL (ref 130–400)
PMV BLD AUTO: 10 FL (ref 7.4–10.4)
POTASSIUM SERPL-SCNC: 4 MMOL/L (ref 3.5–5.1)
PROTHROMBIN TIME: 12.6 SECONDS (ref 11.6–15)
RBC # BLD AUTO: 4.6 10X6/UL (ref 4.2–6.1)
SODIUM SERPL-SCNC: 141 MMOL/L (ref 136–145)
WBC # BLD AUTO: 9.2 10X3/UL (ref 4.8–10.8)

## 2017-07-26 NOTE — NUR
ALERT AND ORIENTED X4. RESTING IN BED. DENIES SOB OR PAIN. NO CHANGE. SINUS
RHTHYM ON TELEMETRY. CONTINUE PLAN OF CARE AND SAFETY PRECAUTIONS.

## 2017-07-26 NOTE — NUR
CTA SCHEDULED. MAKE NPO UNTIL CTA COMPLETE. INFORM PATIENT NOT TO EAT OR DRINK
ANYTHING UNTIL TAKEN TO CT. DENIES ANY NEEDS. BED LOCKED AND LOW. CALL LIGHT
IN REACH. TWO SIDERAILS UP. REFUSE SCDs. SINUS RHYTHM  78bpm ON TELEMETRY.

## 2017-07-26 NOTE — NUR
RESUMED CARE OF PT, LYING IN BED RESPIRATIONS EVEN AND UNLABORED ON ROOM AIR.
75 SR ON TELEMETRY.  LEFT FOREARM SALINE LOCKED.  NO NEEDS VOICED AT THIS
TIME, PLAN OF CARE DISCUSSED.  CALL LIGHT IN REACH.  SEE NURSE ASESSSMENT.
WILL CONTINUE TO MONITOR.

## 2017-07-27 VITALS — DIASTOLIC BLOOD PRESSURE: 67 MMHG | SYSTOLIC BLOOD PRESSURE: 107 MMHG

## 2017-07-27 VITALS — SYSTOLIC BLOOD PRESSURE: 110 MMHG | DIASTOLIC BLOOD PRESSURE: 72 MMHG

## 2017-07-27 VITALS — DIASTOLIC BLOOD PRESSURE: 87 MMHG | SYSTOLIC BLOOD PRESSURE: 137 MMHG

## 2017-07-27 LAB
ANION GAP SERPL CALC-SCNC: 14.6 MMOL/L (ref 8–16)
BASOPHILS NFR BLD AUTO: 0.1 % (ref 0–2)
BUN SERPL-MCNC: 21 MG/DL (ref 7–18)
CALCIUM SERPL-MCNC: 9.1 MG/DL (ref 8.5–10.1)
CHLORIDE SERPL-SCNC: 103 MMOL/L (ref 98–107)
CO2 SERPL-SCNC: 26.2 MMOL/L (ref 21–32)
CREAT SERPL-MCNC: 1 MG/DL (ref 0.6–1.3)
EOSINOPHIL NFR BLD: 3.5 % (ref 0–7)
ERYTHROCYTE [DISTWIDTH] IN BLOOD BY AUTOMATED COUNT: 12.8 % (ref 11.5–14.5)
GLUCOSE SERPL-MCNC: 146 MG/DL (ref 74–106)
HCT VFR BLD CALC: 42 % (ref 42–54)
HGB BLD-MCNC: 14.5 G/DL (ref 13.5–17.5)
IMM GRANULOCYTES NFR BLD: 0.2 % (ref 0–5)
LYMPHOCYTES NFR BLD AUTO: 17.9 % (ref 15–50)
MCH RBC QN AUTO: 30.9 PG (ref 26–34)
MCHC RBC AUTO-ENTMCNC: 34.5 G/DL (ref 31–37)
MCV RBC: 89.6 FL (ref 80–100)
MONOCYTES NFR BLD: 6.3 % (ref 2–11)
NEUTROPHILS NFR BLD AUTO: 72 % (ref 40–80)
OSMOLALITY SERPL CALC.SUM OF ELEC: 284 MOSM/KG (ref 275–300)
PLATELET # BLD: 184 10X3/UL (ref 130–400)
PMV BLD AUTO: 9.9 FL (ref 7.4–10.4)
POTASSIUM SERPL-SCNC: 3.8 MMOL/L (ref 3.5–5.1)
RBC # BLD AUTO: 4.69 10X6/UL (ref 4.2–6.1)
SODIUM SERPL-SCNC: 140 MMOL/L (ref 136–145)
WBC # BLD AUTO: 9.1 10X3/UL (ref 4.8–10.8)

## 2017-07-28 NOTE — EC
PATIENT:LINDEN PRECIADO                DATE OF SERVICE: 07/26/17
SEX: M                                  MEDICAL RECORD: [id number]
DATE OF BIRTH: [birth year]                        LOCATION:D.      D.211
AGE OF PATIENT: 72                             ADMISSION DATE: 07/26/17
 
REFERRING PHYSICIAN:                               
 
INTERPRETING PHYSICIAN: SUBHASH LOMAS MD             
 
 
 
                             ECHOCARDIOGRAM REPORT
  ECHO CHARGES 5               ECHO LIMITED             
               1               DOPPLER ECHO COLOR FLOW  
               2               DOPPLER ECHO PULSE       
 
CLINICAL DIAGNOSIS: TIA                           
 
                         ECHOCARDIOGRAPHIC MEASUREMENTS
      (adult normal given)
   AC root (d.<3.7cm) 0    cm   LV Septum d (<1.2 cm> 0    cm
      Valve Excursion 0    cm     LV Septum (systole) 0    cm
Left Atria (s.<4.0cm> 0    cm          LVPW d(<1.2cm) 0    cm
        RV (d.<2.3cm) 0    cm           LVPW (sytole) 0    cm
  LV diastole(<5.6CM) 0    cm       MV E-F(>70mm/sec) 0    cm
           LV systole 0    cm           LVOT Diameter 2.0  cm
       MV exc.(>10mm) 0    cm
Est.ejection fraction (50-75%)     %   Pericardial Effusion N
 
   DOPPLER:
     LVIT      cm/sec A 0    cm/sec E 0     cm/sec
       LA 0    cm/sec      RVSP 34.1 mmHg
     LVOT 110  cm/sec   AOP1/2T 0    m/s
  Asc. Ao 187  cm/sec
     RVOT 0    cm/sec
       RA 0    cm/sec
       PA 0    cm/sec
 AV Gradient Peak 14.0 mmHg  AV Mean 6.6  mmHg  AV Area 1.9  cm
 MV Gradient Peak 0    mmHg  MV Mean 0    mmHg  MV Area 0    cm
   COMMENTS: *** LIMITED STUDY (2-D,COLOR,DOPPLER) ***    
             *** COMPLETE ECHO DONE ON 6/2/17 ***         
 
 Cardiac Sonographer: Pamela CHAWLA            
      Cardiologist: 1          Dr. Lomas                
             TAPE# [id number]           
                                                                                     
 
 
DATE OF SERVICE:  07/26/2017
 
Limited Echocardiogram
 
FINDINGS:
1.  Left ventricular chamber size is within normal limits.  Left ventricular
systolic function is normal.  Overall ejection fraction estimated at 60%.
2.  Left atrium, right atrium, and right ventricular chamber sizes are within
normal limits.
3.  Valvular structures have normal structure and motion.
 
 
 
ECHOCARDIOGRAM REPORT                          [id number]    LINDEN PRECIADO        
 
 
4.  Doppler interrogation only reveals trace mitral regurgitation, trace
tricuspid regurgitation, neither of which are hemodynamically significant.
5.  No evidence of pericardial effusion or left ventricular thrombus.
 
TRANSINT:[id number] Voice Confirmation ID: [id number] DOCUMENT ID: [id number]
                                           
                                           SUBHASH LOMAS MD             
 
 
 
Electronically Signed by SUBHASH LOMAS on 07/28/17 at 1124
 
 
 
 
 
 
 
 
 
 
 
 
 
 
 
 
 
 
 
 
 
 
 
 
 
 
 
 
 
 
 
 
 
 
 
CC:                                                             [id number]
DICTATION DATE: 07/26/17 1722     :     07/27/17 0105      DIS IN  
                                                                      07/27/17
[facility]                                          
[id number]

## 2017-08-03 ENCOUNTER — HOSPITAL ENCOUNTER (OUTPATIENT)
Dept: HOSPITAL 84 - D.LAB | Age: 72
Discharge: HOME | End: 2017-08-03
Attending: THORACIC SURGERY (CARDIOTHORACIC VASCULAR SURGERY)
Payer: MEDICARE

## 2017-08-03 VITALS — BODY MASS INDEX: 29.9 KG/M2

## 2017-08-03 DIAGNOSIS — Z79.899: ICD-10-CM

## 2017-08-03 DIAGNOSIS — Z51.81: Primary | ICD-10-CM

## 2017-08-03 LAB — PA ADP PRP-ACNC: 117 PRU (ref 194–418)

## 2017-08-17 ENCOUNTER — HOSPITAL ENCOUNTER (OUTPATIENT)
Dept: HOSPITAL 84 - D.LAB | Age: 72
Discharge: HOME | End: 2017-08-17
Attending: THORACIC SURGERY (CARDIOTHORACIC VASCULAR SURGERY)
Payer: MEDICARE

## 2017-08-17 VITALS — BODY MASS INDEX: 29.9 KG/M2

## 2017-08-17 DIAGNOSIS — Z79.02: Primary | ICD-10-CM

## 2017-08-17 LAB — PA ADP PRP-ACNC: 122 PRU (ref 194–418)

## 2017-10-10 ENCOUNTER — HOSPITAL ENCOUNTER (INPATIENT)
Dept: HOSPITAL 84 - D.SDCHOLD | Age: 72
LOS: 1 days | Discharge: HOME | DRG: 39 | End: 2017-10-11
Attending: THORACIC SURGERY (CARDIOTHORACIC VASCULAR SURGERY) | Admitting: THORACIC SURGERY (CARDIOTHORACIC VASCULAR SURGERY)
Payer: MEDICARE

## 2017-10-10 VITALS — SYSTOLIC BLOOD PRESSURE: 116 MMHG | DIASTOLIC BLOOD PRESSURE: 51 MMHG

## 2017-10-10 VITALS — SYSTOLIC BLOOD PRESSURE: 138 MMHG | DIASTOLIC BLOOD PRESSURE: 58 MMHG

## 2017-10-10 VITALS — SYSTOLIC BLOOD PRESSURE: 123 MMHG | DIASTOLIC BLOOD PRESSURE: 53 MMHG

## 2017-10-10 VITALS — SYSTOLIC BLOOD PRESSURE: 140 MMHG | DIASTOLIC BLOOD PRESSURE: 61 MMHG

## 2017-10-10 VITALS — SYSTOLIC BLOOD PRESSURE: 134 MMHG | DIASTOLIC BLOOD PRESSURE: 59 MMHG

## 2017-10-10 VITALS — DIASTOLIC BLOOD PRESSURE: 53 MMHG | SYSTOLIC BLOOD PRESSURE: 121 MMHG

## 2017-10-10 VITALS — SYSTOLIC BLOOD PRESSURE: 133 MMHG | DIASTOLIC BLOOD PRESSURE: 58 MMHG

## 2017-10-10 VITALS — DIASTOLIC BLOOD PRESSURE: 56 MMHG | SYSTOLIC BLOOD PRESSURE: 136 MMHG

## 2017-10-10 VITALS — DIASTOLIC BLOOD PRESSURE: 53 MMHG | SYSTOLIC BLOOD PRESSURE: 120 MMHG

## 2017-10-10 VITALS — SYSTOLIC BLOOD PRESSURE: 120 MMHG | DIASTOLIC BLOOD PRESSURE: 55 MMHG

## 2017-10-10 VITALS — DIASTOLIC BLOOD PRESSURE: 59 MMHG | SYSTOLIC BLOOD PRESSURE: 140 MMHG

## 2017-10-10 VITALS — DIASTOLIC BLOOD PRESSURE: 56 MMHG | SYSTOLIC BLOOD PRESSURE: 134 MMHG

## 2017-10-10 VITALS — DIASTOLIC BLOOD PRESSURE: 58 MMHG | SYSTOLIC BLOOD PRESSURE: 131 MMHG

## 2017-10-10 VITALS — DIASTOLIC BLOOD PRESSURE: 60 MMHG | SYSTOLIC BLOOD PRESSURE: 140 MMHG

## 2017-10-10 VITALS — SYSTOLIC BLOOD PRESSURE: 126 MMHG | DIASTOLIC BLOOD PRESSURE: 54 MMHG

## 2017-10-10 VITALS — DIASTOLIC BLOOD PRESSURE: 56 MMHG | SYSTOLIC BLOOD PRESSURE: 128 MMHG

## 2017-10-10 VITALS — DIASTOLIC BLOOD PRESSURE: 53 MMHG | SYSTOLIC BLOOD PRESSURE: 118 MMHG

## 2017-10-10 VITALS — DIASTOLIC BLOOD PRESSURE: 58 MMHG | SYSTOLIC BLOOD PRESSURE: 137 MMHG

## 2017-10-10 VITALS — DIASTOLIC BLOOD PRESSURE: 73 MMHG | SYSTOLIC BLOOD PRESSURE: 130 MMHG

## 2017-10-10 VITALS — SYSTOLIC BLOOD PRESSURE: 133 MMHG | DIASTOLIC BLOOD PRESSURE: 57 MMHG

## 2017-10-10 VITALS — DIASTOLIC BLOOD PRESSURE: 54 MMHG | SYSTOLIC BLOOD PRESSURE: 135 MMHG

## 2017-10-10 VITALS — SYSTOLIC BLOOD PRESSURE: 108 MMHG | DIASTOLIC BLOOD PRESSURE: 51 MMHG

## 2017-10-10 VITALS — SYSTOLIC BLOOD PRESSURE: 124 MMHG | DIASTOLIC BLOOD PRESSURE: 54 MMHG

## 2017-10-10 VITALS — SYSTOLIC BLOOD PRESSURE: 115 MMHG | DIASTOLIC BLOOD PRESSURE: 56 MMHG

## 2017-10-10 VITALS — SYSTOLIC BLOOD PRESSURE: 138 MMHG | DIASTOLIC BLOOD PRESSURE: 56 MMHG

## 2017-10-10 VITALS — DIASTOLIC BLOOD PRESSURE: 58 MMHG | SYSTOLIC BLOOD PRESSURE: 133 MMHG

## 2017-10-10 VITALS — SYSTOLIC BLOOD PRESSURE: 134 MMHG | DIASTOLIC BLOOD PRESSURE: 53 MMHG

## 2017-10-10 VITALS — SYSTOLIC BLOOD PRESSURE: 139 MMHG | DIASTOLIC BLOOD PRESSURE: 59 MMHG

## 2017-10-10 VITALS — SYSTOLIC BLOOD PRESSURE: 151 MMHG | DIASTOLIC BLOOD PRESSURE: 60 MMHG

## 2017-10-10 VITALS — DIASTOLIC BLOOD PRESSURE: 55 MMHG | SYSTOLIC BLOOD PRESSURE: 116 MMHG

## 2017-10-10 VITALS — BODY MASS INDEX: 29.9 KG/M2 | BODY MASS INDEX: 30.7 KG/M2 | BODY MASS INDEX: 30.8 KG/M2

## 2017-10-10 VITALS — SYSTOLIC BLOOD PRESSURE: 135 MMHG | DIASTOLIC BLOOD PRESSURE: 54 MMHG

## 2017-10-10 DIAGNOSIS — R47.1: ICD-10-CM

## 2017-10-10 DIAGNOSIS — I10: ICD-10-CM

## 2017-10-10 DIAGNOSIS — E78.5: ICD-10-CM

## 2017-10-10 DIAGNOSIS — I25.119: ICD-10-CM

## 2017-10-10 DIAGNOSIS — I65.23: Primary | ICD-10-CM

## 2017-10-10 DIAGNOSIS — E11.65: ICD-10-CM

## 2017-10-10 LAB
ALBUMIN SERPL-MCNC: 4.2 G/DL (ref 3.4–5)
ALP SERPL-CCNC: 84 U/L (ref 46–116)
ALT SERPL-CCNC: 45 U/L (ref 10–68)
ANION GAP SERPL CALC-SCNC: 12.1 MMOL/L (ref 8–16)
APPEARANCE UR: (no result)
APTT BLD: 27.6 SECONDS (ref 22.8–39.4)
BACTERIA #/AREA URNS HPF: (no result) /HPF
BILIRUB SERPL-MCNC: 0.42 MG/DL (ref 0.2–1.3)
BILIRUB SERPL-MCNC: NEGATIVE MG/DL
BUN SERPL-MCNC: 18 MG/DL (ref 7–18)
CALCIUM SERPL-MCNC: 9.2 MG/DL (ref 8.5–10.1)
CHLORIDE SERPL-SCNC: 102 MMOL/L (ref 98–107)
CO2 SERPL-SCNC: 29 MMOL/L (ref 21–32)
COLOR UR: YELLOW
CREAT SERPL-MCNC: 1 MG/DL (ref 0.6–1.3)
ERYTHROCYTE [DISTWIDTH] IN BLOOD BY AUTOMATED COUNT: 13.1 % (ref 11.5–14.5)
GLOBULIN SER-MCNC: 3.4 G/L
GLUCOSE SERPL-MCNC: 140 MG/DL (ref 74–106)
GLUCOSE SERPL-MCNC: NEGATIVE MG/DL
HCT VFR BLD CALC: 40.1 % (ref 42–54)
HGB BLD-MCNC: 13.6 G/DL (ref 13.5–17.5)
INR PPP: 0.88 (ref 0.85–1.17)
KETONES UR STRIP-MCNC: NEGATIVE MG/DL
MCH RBC QN AUTO: 30.1 PG (ref 26–34)
MCHC RBC AUTO-ENTMCNC: 33.9 G/DL (ref 31–37)
MCV RBC: 88.7 FL (ref 80–100)
MUCOUS THREADS #/AREA URNS LPF: (no result) /LPF
NITRITE UR-MCNC: NEGATIVE MG/ML
OSMOLALITY SERPL CALC.SUM OF ELEC: 281 MOSM/KG (ref 275–300)
PH UR STRIP: 5 [PH] (ref 5–6)
PLATELET # BLD: 218 10X3/UL (ref 130–400)
PMV BLD AUTO: 10 FL (ref 7.4–10.4)
POTASSIUM SERPL-SCNC: 4.1 MMOL/L (ref 3.5–5.1)
PROT SERPL-MCNC: 7.6 G/DL (ref 6.4–8.2)
PROT UR-MCNC: NEGATIVE MG/DL
PROTHROMBIN TIME: 11.7 SECONDS (ref 11.6–15)
RBC # BLD AUTO: 4.52 10X6/UL (ref 4.2–6.1)
RBC #/AREA URNS HPF: (no result) /HPF (ref 0–5)
SODIUM SERPL-SCNC: 139 MMOL/L (ref 136–145)
SP GR UR STRIP: 1.02 (ref 1–1.02)
SQUAMOUS #/AREA URNS HPF: (no result) /HPF (ref 0–5)
UROBILINOGEN UR-MCNC: NORMAL MG/DL
WBC # BLD AUTO: 10 10X3/UL (ref 4.8–10.8)
WBC #/AREA URNS HPF: (no result) /HPF (ref 0–5)

## 2017-10-10 PROCEDURE — 03CK0ZZ EXTIRPATION OF MATTER FROM RIGHT INTERNAL CAROTID ARTERY, OPEN APPROACH: ICD-10-PCS | Performed by: THORACIC SURGERY (CARDIOTHORACIC VASCULAR SURGERY)

## 2017-10-10 PROCEDURE — 03UK0JZ SUPPLEMENT RIGHT INTERNAL CAROTID ARTERY WITH SYNTHETIC SUBSTITUTE, OPEN APPROACH: ICD-10-PCS | Performed by: THORACIC SURGERY (CARDIOTHORACIC VASCULAR SURGERY)

## 2017-10-10 NOTE — HP
PATIENT: LINDEN PRECIADO                                MEDICAL RECORD: [id number]
ACCOUNT: [id number]                                    LOCATION:[facility]
: 45                                            ADMISSION DATE: 10/10/17
                                                         
 
                             HISTORY AND PHYSICAL EXAMINATION
 
 
NameLINDEN PRECIADO (73yo, M) ID# [id number]. Date/Time2017
09:[id number] Dept.[facility]_[place] [facility]
ClinicProviderEDWILY PENNINGTON MDInsuranceMed Primary: [place])
Insurance # : [id number]
PCP : LIS ACUNA
Referring Provider Name : LIS ACUNA
Employer Name : RETIRED
Med Secondary: [employer] [place] (POS)
Insurance # : [id number]
Policy/Group # : [id number]
Employer Name : RETIRED
Med Contracts:  [employer]
Insurance # : [id number]
Employer Name : RETIRED
Prescription: CMX - Member is eligible. Chief Complaint
Followup: Carotid artery stenosis
 
s/p LCEA 17 
need RCEA?
Patient's Care Team
Primary Care Provider ():  LIS ACUNA: [address] [address], Ph [phone], Fax [phone] 
Referring Provider (): LIS ACUNA: [address] [place] [address], Ph [phone], Fax [phone] 
Patient's Pharmacies
[facility] ([facility]): [address] [facility] [address], [facility] [place] [address],
Ph [phone], Fax [phone]
Vitals
BP:132/82 sitting R arm 2017 09:05 amHR:88R/R 2017 09:05 amHt:5 ft 11 in
2017 09:03 amWt:211 lbs 2017 09:04 amBMI:29.4 2017 09:04
amAllergies
Reviewed Allergies
PENICILLINSMedications
Reviewed Medications
atorvastatin 80 mg hyadml93/28/17   filledCaremarkazithromycin 250 mg bpnwgz67/18/17
  filledCaremarkBydureon 2 mg/0.65 mL subcutaneous pen yevkatmc65/21/17  
filledCaremarkclopidogrel 75 mg whojtf48/24/17   filledCaremarkEffient 10 mg
rzjsmt77/21/17   filledCaremarkglipiZIDE 10 mg ljypmj68/28/17  
filledCaremarkInvokana 300 mg syxlew36/28/17   filledCaremarkisosorbide mononitrate
ER 30 mg tablet,extended release 24 hr17   filledCaremarklevETIRAcetam 250 mg
syyajk37/21/17   filledCaremarkmetFORMIN 1,000 mg jmklon59/28/17  
filledCaremarkmetoprolol succinate ER 50 mg tablet,extended release 24 hr17  
filledCaremarkRanexa 1,000 mg tablet,extended bbztter35/23/17   filledCaremarkRanexa
500 mg tablet,extended lvoiuqe86/28/17   filledCaremarktraMADol 50 mg yjmavf08/03/17
  filledCaremarkvalsartan 160 mg-hydrochlorothiazide 25 mg rybwxg64/21/17  
filledCaremarkVictoza 3-Jose 0.6 mg/0.1 mL (18 mg/3 mL) subcutaneous pen
dlbramfp59/25/17   filledCaremarkProblems
Reviewed Problems
 
Carotid artery stenosis - Onset: 2017
 
 
 
HISTORY AND PHYSICAL                           [id number]    LINDEN PRECIADO        
 
 
Family History
Discussed Family History
 
Social History
Discussed Social History
Cardiology
Smoking Status: Never smoker
High Cholesterol: Y
High blood pressure: Y
Diabetes: Y
Alcohol intake: Occasional
Marital status: 
Is blood transfusion acceptable in an emergency?: Y
Surgical History
Reviewed Surgical History
 
Other - PTCA/stents
Other - Left knee surgery
Other - Bilaterial rotator cuff
Carotid Endarterectomy - 2017 - left
Past Medical History
Discussed Past Medical History
Carotid Stenosis: Y
Diabetes: Y
Hyperlipidemia: Y
Hypertension: Y
Documents for Discussion
N/A
Screening
None recorded.
HPI
Cerebral Vascular Disease
Reported by patient.
Quality: dizziness
Aggravating Factors: position change
carotid artery disease
ROS
Patient reports no loss of consciousness, no weakness, no numbness, no seizures, no
dizziness, and no headaches; history of limited seizure activity in [facility]. He
reports no fever, no night sweats, no significant weight gain, no significant weight
loss, and no exercise intolerance. He reports no dry eyes, no irritation, and no
vision change. He reports no difficulty hearing and no ear pain. He reports no frequ
e nt nosebleeds and no nose/sinus problems. He reports no sore throat, no bleeding
gums, no snoring, no dry mouth, no mouth ulcers, no oral abnormalities, and no teeth
problems. He reports no jugular vein distension and no swollen glands. He reports no
ches t  pain, no arm pain on exertion, no shortness of breath when walking, no
shortness of breath when lying down, no palpitations, and no known heart murmur. He
reports no cough, no wheezing, no shortness of breath, and no coughing up blood. He
reports no abdo m inal pain, no vomiting, normal appetite, no diarrhea, not vomiting
blood, no nausea, and no constipation. He reports no incontinence, no difficulty
urinating, no hematuria, and no increased frequency. He reports no muscle aches, no
muscle weakness, no art h ralgias/joint pain, no back pain, and no swelling in the
extremities. He reports no abnormal mole, no jaundice, and no rashes. He reports no
depression, no sleep disturbances, feeling safe in relationship, and no alcohol
abuse. He reports no fatigue. He r eports no swollen glands and no bruising. He
 
 
 
HISTORY AND PHYSICAL                           [id number]    LINDEN PRECIADO        
 
 
reports no runny nose, no sinus pressure, no itching, no hives, and no frequent
sneezing. 
ROS as noted in the HPI
Physical Exam
Patient is a 72-year-old male.
 
Constitutional: General Appearance well nourished and developed and
healthy-appearing. Level of Distress NAD. Ambulation ambulating normally.
 
Cardiovascular:  Apical Impulse not displaced or no thrill. Heart Auscultation
normal s1 and s2; no murmurs, rubs, or gallops; and RRR. Arterial Pulses no
abdominal aorta bruits, femoral bruits, or popliteal bruits and 2+ bilateral,
carotid 2+ bilateral, femoral 2+ bilateral, popliteal 2+ bilateral, and dorsalis
pedis 2+ bilateral. Edema no edema or varicosities.
 
Lungs: Repiratory Effort no dyspnea. Percussion no hyp erresonance or dullness or
flatness. Auscultation no wheezing, rhonchi, or rales / crackles and breathing
sounds normal, good air movement, and CTA except as noted.
 
Abdomen: Bowl Sounds normal. Inspection and Palpation no tenderness, guarding,
masses, or rebound tenderness and soft and non-distended. Liver non-tender and no
hepatomegaly. Spleen non-tender and no splenomegaly. Hernia none palpable.
 
Musculoskeletal System: Gait And Stance normal gait and stance. Digits and Nails
normal nails and no cyanosis.
 
Neurologic: Cranial Nerves grossly intact. Reflexes DTRs 2+ bilaterally throughout.
Sensation grossly intact.
 
Lymph Nodes: Lymph Nodes no cervical LAD, supraclavicular LAD, axillary LAD, or
inguinal LAD.
 
Eyes: Lids and Conjunctivae no discharge or pallor and non-injected. Pupils PERRLA.
Cornea grossly intact. EOM EOMI. Lens clear. Sclerae non-icteric.
 
Neck: Neck no masses or enlarged lymph nodes and supple, trachea midline, and
carotid bruits (right internal carotid artery). Thyroid no enlargement or nodules
and non-tender.
 
Skin: Inspection and Palpation no rash, lesions, ulcers, jaundice, or abnormal nevi.
Assessment / Plan
right carotid artery stenosis
 
1. Carotid artery stenosis
I65.23: Occlusion and stenosis of bilateral carotid arteries
CAROTID STENOSIS: CARE INSTRUCTIONS
 
Discussion Notes
scheduled for right carotid endarterectomy.
I have discussed his disease process with him in detail as well as the alternative
methods of treatment. We discussed right carotid endarterectomy including t he
expected benefits and risk which included bleeding, infection, stroke, death, and
the imponderables. After hearing the expected benefits and risk he would like
proceed with right carotid endarterectomy
Scheduled for 10 October
 
 
 
HISTORY AND PHYSICAL                           [id number]    LINDEN PRECIADO EDWARD MD             
 
 
 
Electronically Signed by LUIS FERNANDO PENNINGTON on 10/10/17 at 1137
 
 
 
 
 
 
 
 
 
 
 
 
 
 
 
 
 
 
 
 
 
 
 
 
 
 
 
 
 
 
 
 
 
 
 
 
 
 
 
 
 
CC:                                                             [id number]
DICTATION DATE: 17 0900     : DM  10/02/17 1658      ADM IN  
                                                                              
[facility]                                          
[address] [place] [address]

## 2017-10-10 NOTE — NUR
PT RESTING COMFORTABLY AT THIS TIME. VSS. ABLE TO WEAN OFF NITRO GTT AT THIS
TIME. WILL CONTINUE TO MONITOR.

## 2017-10-10 NOTE — NUR
SHIFT REASSESSMENT COMPLETED SEE FLOWSHEET. PT IS NOW CURRENTLY ON NITRO AT
3ML/HR TO MAINTAIN ORDERED PARIMETERS. PT PERFORMED PERSONAL HYGIENE WITH SET
UP HELP ONLY. IS INDEPENDENTLY TURNING SELF. RIGHT NECK IS SOFT AND SUPPLE AT
SURGICAL SITE. VANESSA DOES HAVE SMALL AMOUNT OF BLOODY DRAINAGE PRESENT.

## 2017-10-10 NOTE — NUR
REPORT RECEIVED AND CARE ASSUMED. INITIAL SHIFT ASSESSMENT COMPLETED SEE
FLOWSHEET. PT CURRENTLY BEING MONITORED PER STANDARD CVICU PROTOCOL WTIH ALL
ALARMS VERIFIED AND SET. A-LINE LEVELED AND ZEROED. ALL IV LINES AND FLUIDS
ARE CURRENT AND NOT DUE TO BE CHANGED. LABELED PER HOPITAL PROTOCOL.
NEURO CHECKS INTACT FOR PT. DRESSUBG TO RIGHT SIDE OF NECK CDI AND IS SURGICAL
DRESSING. NO EDEMA NOTED. ICE PACK TO SITE. FRESH ICE PACK APPLIED. VANESSA DRAIN
TO RIGHT UPPER CHEST COMPRESSED AND 50CC OF BLODDY DRAINAGE EMPTIED,
RECOMPRESSED.

## 2017-10-10 NOTE — NUR
SB/P INCREASED OVER ORDERED 140 LIMITS NITRO RESTARTED AS PER IV FLOWSHEET.
ALL ADJUSTMENTS RECORDED THERE. HS MEDS GIVEN AS DOCUMENTED ON MAR. PT WAS
GIVEN ULTRAM FOR SLIGHT HEADACHE. NO SWALLOWING DIFFICULTY NOTED

## 2017-10-10 NOTE — NUR
PT ARRIVED BY BED FROM OR. SWITCHED OVER TO CVICU MONITORS. VSS AT THIS TIME.
ICE PACK TO RIGHT NECK. PT ALERT AND ORIENTED. SCDs APPLIED. CALL LIGHT WITHIN
REACH.

## 2017-10-11 VITALS — SYSTOLIC BLOOD PRESSURE: 135 MMHG | DIASTOLIC BLOOD PRESSURE: 57 MMHG

## 2017-10-11 VITALS — DIASTOLIC BLOOD PRESSURE: 58 MMHG | SYSTOLIC BLOOD PRESSURE: 136 MMHG

## 2017-10-11 VITALS — DIASTOLIC BLOOD PRESSURE: 56 MMHG | SYSTOLIC BLOOD PRESSURE: 134 MMHG

## 2017-10-11 VITALS — DIASTOLIC BLOOD PRESSURE: 47 MMHG | SYSTOLIC BLOOD PRESSURE: 127 MMHG

## 2017-10-11 VITALS — SYSTOLIC BLOOD PRESSURE: 130 MMHG | DIASTOLIC BLOOD PRESSURE: 55 MMHG

## 2017-10-11 VITALS — SYSTOLIC BLOOD PRESSURE: 140 MMHG | DIASTOLIC BLOOD PRESSURE: 53 MMHG

## 2017-10-11 VITALS — SYSTOLIC BLOOD PRESSURE: 128 MMHG | DIASTOLIC BLOOD PRESSURE: 54 MMHG

## 2017-10-11 VITALS — DIASTOLIC BLOOD PRESSURE: 58 MMHG | SYSTOLIC BLOOD PRESSURE: 135 MMHG

## 2017-10-11 VITALS — SYSTOLIC BLOOD PRESSURE: 138 MMHG | DIASTOLIC BLOOD PRESSURE: 58 MMHG

## 2017-10-11 VITALS — SYSTOLIC BLOOD PRESSURE: 140 MMHG | DIASTOLIC BLOOD PRESSURE: 54 MMHG

## 2017-10-11 VITALS — SYSTOLIC BLOOD PRESSURE: 130 MMHG | DIASTOLIC BLOOD PRESSURE: 65 MMHG

## 2017-10-11 VITALS — DIASTOLIC BLOOD PRESSURE: 56 MMHG | SYSTOLIC BLOOD PRESSURE: 116 MMHG

## 2017-10-11 VITALS — SYSTOLIC BLOOD PRESSURE: 129 MMHG | DIASTOLIC BLOOD PRESSURE: 51 MMHG

## 2017-10-11 VITALS — SYSTOLIC BLOOD PRESSURE: 137 MMHG | DIASTOLIC BLOOD PRESSURE: 53 MMHG

## 2017-10-11 VITALS — SYSTOLIC BLOOD PRESSURE: 120 MMHG | DIASTOLIC BLOOD PRESSURE: 56 MMHG

## 2017-10-11 VITALS — SYSTOLIC BLOOD PRESSURE: 130 MMHG | DIASTOLIC BLOOD PRESSURE: 54 MMHG

## 2017-10-11 VITALS — SYSTOLIC BLOOD PRESSURE: 138 MMHG | DIASTOLIC BLOOD PRESSURE: 55 MMHG

## 2017-10-11 VITALS — SYSTOLIC BLOOD PRESSURE: 140 MMHG | DIASTOLIC BLOOD PRESSURE: 60 MMHG

## 2017-10-11 VITALS — DIASTOLIC BLOOD PRESSURE: 57 MMHG | SYSTOLIC BLOOD PRESSURE: 132 MMHG

## 2017-10-11 VITALS — DIASTOLIC BLOOD PRESSURE: 57 MMHG | SYSTOLIC BLOOD PRESSURE: 134 MMHG

## 2017-10-11 VITALS — SYSTOLIC BLOOD PRESSURE: 130 MMHG | DIASTOLIC BLOOD PRESSURE: 52 MMHG

## 2017-10-11 VITALS — SYSTOLIC BLOOD PRESSURE: 125 MMHG | DIASTOLIC BLOOD PRESSURE: 51 MMHG

## 2017-10-11 VITALS — SYSTOLIC BLOOD PRESSURE: 127 MMHG | DIASTOLIC BLOOD PRESSURE: 51 MMHG

## 2017-10-11 VITALS — DIASTOLIC BLOOD PRESSURE: 56 MMHG | SYSTOLIC BLOOD PRESSURE: 139 MMHG

## 2017-10-11 VITALS — SYSTOLIC BLOOD PRESSURE: 142 MMHG | DIASTOLIC BLOOD PRESSURE: 56 MMHG

## 2017-10-11 VITALS — DIASTOLIC BLOOD PRESSURE: 55 MMHG | SYSTOLIC BLOOD PRESSURE: 130 MMHG

## 2017-10-11 VITALS — DIASTOLIC BLOOD PRESSURE: 51 MMHG | SYSTOLIC BLOOD PRESSURE: 137 MMHG

## 2017-10-11 VITALS — SYSTOLIC BLOOD PRESSURE: 136 MMHG | DIASTOLIC BLOOD PRESSURE: 59 MMHG

## 2017-10-11 VITALS — DIASTOLIC BLOOD PRESSURE: 46 MMHG | SYSTOLIC BLOOD PRESSURE: 123 MMHG

## 2017-10-11 VITALS — DIASTOLIC BLOOD PRESSURE: 54 MMHG | SYSTOLIC BLOOD PRESSURE: 125 MMHG

## 2017-10-11 VITALS — SYSTOLIC BLOOD PRESSURE: 134 MMHG | DIASTOLIC BLOOD PRESSURE: 49 MMHG

## 2017-10-11 VITALS — DIASTOLIC BLOOD PRESSURE: 59 MMHG | SYSTOLIC BLOOD PRESSURE: 139 MMHG

## 2017-10-11 VITALS — DIASTOLIC BLOOD PRESSURE: 56 MMHG | SYSTOLIC BLOOD PRESSURE: 138 MMHG

## 2017-10-11 VITALS — SYSTOLIC BLOOD PRESSURE: 137 MMHG | DIASTOLIC BLOOD PRESSURE: 51 MMHG

## 2017-10-11 VITALS — SYSTOLIC BLOOD PRESSURE: 132 MMHG | DIASTOLIC BLOOD PRESSURE: 55 MMHG

## 2017-10-11 NOTE — NUR
Is the patient Alert and Oriented? Yes  0
* How many steps to enter\exit or inside your home? 3-4  0
* PCP Dr. Johnson  0
* Pharmacy [facility]  0
* Preadmission Environment Home Alone  0
* ADLs Independent  0
* Equipment None  0
* List name and contact numbers for known caregivers / representatives who
currently or will assist patient after discharge: BROTHER: PADMA [phone]
0
* Community resources currently utilized None  0
* Additional services required to return to the preadmission environment? No
0
* Can the patient safely return to the preadmission environment? Yes  0
* Has this patient been hospitalized within the prior 30 days at any hospital?
No
PATIENT IS AWAKE AND ALERT SITTING AT BEDSIDE. HIS BROTHER, PADMA, IS PRESENT
AND WILL DRIVE HIM HOME. PATIENT STATES HIS BROTHER AND SISTER LIVE CLOSE AND
WILL ASSIST HIM AS NEEDED. PATIENT'S PCP IS DR. JOHNSON. HE GETS HIS MEDS
FROM [facility] IN THE [facility]. PATIENT DENIES USE OF ANY DME AND
STATES HE HAS NEVER HAD HOME HEALTH. PATIENT DENIES ANY DISCHARGE NEEDS AT
THIS TIME.

## 2017-10-11 NOTE — NUR
PT TAKEN BY WHEELCHAIR TO VEHICLE. NO S/S OF DISTRESS NOTED. ALL PAPERWORK IN
HAND. REPORTS THAT HE HAS ALL BELONGINGS IN HAND.

## 2017-10-11 NOTE — NUR
SHIFT REASSESSMENT COMPLETED. NO SIGNIFICANT CHANGES. PT REMAINS MONITORED PER
STANDARD CVICU PROTOCOL ALL ALARMS VERIFIED. NITRO REMAINS AT 3CC VSS AT THIS
TIME. PT IS AWAKE. STATES HE HAS A "TWINGE OF PAIN EVERY ONCE IN A WHILE"
DENIES NEED FOR MEDICINAL INTERVENTION. STATES 0 WHEN PRESSED FOR A "NUMBER".
RIGHT SIDE OF NECK AT SURGICAL SITE REMAINS SOFT TO LIGHT TOUCH. NO DRAINAGE.
VANESSA WITH BLOODY DRAINAGE PRESENT.

## 2017-10-11 NOTE — NUR
LEFT RADIAL A-LINE D/C'D WITH CATH TIP INTACT. PT TOLERATED WELL. ORTEGA CATH
D/C'D. PT GIVEN URINAL. PT AMBULATED TO CHAIR. TOLERATED WELL. STEADY GAIT
NOTED. CALL LIGHT WITHIN REACH.

## 2017-10-11 NOTE — NUR
RECEIVED PT FOR CARE. PT SITTING UP IN BED WATCHING TV. DENIES PAIN AT THIS
TIME. VSS. NITRO GTT OFF. WILL GIVE A.M. MEDS. TOLERATING PO.

## 2017-10-14 NOTE — OP
PATIENT NAME:  LINDEN PRECIADO                          MEDICAL RECORD: [id number]
:45                                             LOCATION:[facility]
                                                         ADMISSION DATE:10/10/17
SURGEON:  LUIS FERNANDO PENNINGTON MD             
 
 
DATE OF OPERATION:  10/10/2017
 
SURGEON:  Luis Fernando Pennington MD.
 
ANESTHESIA:  General, Dr. Catalan.
 
OPERATION PERFORMED:  Right carotid endarterectomy with patch angioplasty.
 
PREOPERATIVE DIAGNOSIS:  Severe right internal carotid artery stenosis.
 
POSTOPERATIVE DIAGNOSIS:  Severe right internal carotid artery stenosis.
 
INDICATION FOR OPERATION:  Severe right internal carotid artery stenosis.
 
FINDINGS AT OPERATION:  Severe right internal carotid artery stenosis.  There
was no EEG change with clamping or unclamping of the carotid artery.
 
ESTIMATED BLOOD LOSS:  Less than 50 mL.
 
DESCRIPTION OF PROCEDURE:  After informed consent, adequate preoperative
medication evaluation, the patient was brought to the operating room, placed on
the table in the supine position.  After induction of general endotracheal
anesthesia and application of appropriate monitoring devices, the right neck and
chest were prepped and draped in a sterile field, utilizing Betadine scrub,
alcohol, and Betadine solution.  A Betadine-impregnated drape was also used.  An
oblique incision was made in the skin crease.  Dissection carried down the
fascia.  Hemostasis was maintained with electrocautery.  Facial vein was
identified and divided.  Utilizing sharp dissection, the common carotid,
internal and external carotid arteries were dissected free from surrounding
structures, protecting the neurological structures.  The patient was given a
calculated dose of heparin.  After 3 minutes, clamps were applied.  After 2
minutes, no EEG changes.  The arteriotomy was made and extended with Pompa
scissors.  Artery underwent endarterectomy sharply.  Artery underwent extensive
debridement and irrigation.  Utilizing a CorMatrix vascular patch and running
7-0 Prolene suture, the arteriotomy was closed with patch angioplasty technique.
 All maneuvers to remove trapped air were performed.  The clamps were removed
sequentially.  There were no EEG changes.  The patient was given a calculated
dose of protamine to reverse the heparin.  Hemostasis was achieved and a #7
Vikas-Guevara drain was left in the depth of wound and brought through the base
of the neck.  Neck was again irrigated.  Instrument count and sponge count were
correct times 2.  Neck was closed in layers utilizing 3-0 Vicryl on the
platysma, 5-0 subcuticular Monocryl on the skin.  Sterile dressings were
applied.  The patient tolerated the procedure well and was transferred to the 
ICU in satisfactory condition.
 
TRANSINT:[id number] Voice Confirmation ID: [id number] DOCUMENT ID: [id number]
 
 
 
OPERATIVE REPORT                               [id number]    LINDEN PRECIADO EDWARD MD             
 
 
 
Electronically Signed by LUIS FERNANDO PENNINGTON on 10/14/17 at 1144
 
 
 
 
 
 
 
 
 
 
 
 
 
 
 
 
 
 
 
 
 
 
 
 
 
 
 
 
 
 
 
 
 
 
 
 
 
 
 
 
 
CC:                                                             [id number]
DICTATION DATE: 10/10/17 160     :     10/10/17 1630      DIS IN  
                                                                      10/11/17
[place] [id number] [place] [id number]

## 2017-12-14 ENCOUNTER — HOSPITAL ENCOUNTER (EMERGENCY)
Dept: HOSPITAL 84 - D.ER | Age: 72
Discharge: HOME | End: 2017-12-14
Payer: MEDICARE

## 2017-12-14 VITALS — BODY MASS INDEX: 30.7 KG/M2

## 2017-12-14 DIAGNOSIS — R22.1: Primary | ICD-10-CM

## 2017-12-14 DIAGNOSIS — E11.9: ICD-10-CM

## 2017-12-14 DIAGNOSIS — I10: ICD-10-CM

## 2017-12-14 LAB
ALBUMIN SERPL-MCNC: 4.1 G/DL (ref 3.4–5)
ALP SERPL-CCNC: 101 U/L (ref 46–116)
ALT SERPL-CCNC: 49 U/L (ref 10–68)
ANION GAP SERPL CALC-SCNC: 15.7 MMOL/L (ref 8–16)
BASOPHILS NFR BLD AUTO: 0.2 % (ref 0–2)
BILIRUB SERPL-MCNC: 0.3 MG/DL (ref 0.2–1.3)
BUN SERPL-MCNC: 22 MG/DL (ref 7–18)
CALCIUM SERPL-MCNC: 9.5 MG/DL (ref 8.5–10.1)
CHLORIDE SERPL-SCNC: 100 MMOL/L (ref 98–107)
CO2 SERPL-SCNC: 25.5 MMOL/L (ref 21–32)
CREAT SERPL-MCNC: 1.2 MG/DL (ref 0.6–1.3)
EOSINOPHIL NFR BLD: 3.4 % (ref 0–7)
ERYTHROCYTE [DISTWIDTH] IN BLOOD BY AUTOMATED COUNT: 12.9 % (ref 11.5–14.5)
GLOBULIN SER-MCNC: 3.1 G/L
GLUCOSE SERPL-MCNC: 260 MG/DL (ref 74–106)
HCT VFR BLD CALC: 39.3 % (ref 42–54)
HGB BLD-MCNC: 13.5 G/DL (ref 13.5–17.5)
IMM GRANULOCYTES NFR BLD: 0.3 % (ref 0–5)
LYMPHOCYTES NFR BLD AUTO: 17.1 % (ref 15–50)
MCH RBC QN AUTO: 30.6 PG (ref 26–34)
MCHC RBC AUTO-ENTMCNC: 34.4 G/DL (ref 31–37)
MCV RBC: 89.1 FL (ref 80–100)
MONOCYTES NFR BLD: 4.9 % (ref 2–11)
NEUTROPHILS NFR BLD AUTO: 74.1 % (ref 40–80)
OSMOLALITY SERPL CALC.SUM OF ELEC: 285 MOSM/KG (ref 275–300)
PLATELET # BLD: 242 10X3/UL (ref 130–400)
PMV BLD AUTO: 10.1 FL (ref 7.4–10.4)
POTASSIUM SERPL-SCNC: 4.2 MMOL/L (ref 3.5–5.1)
PROT SERPL-MCNC: 7.2 G/DL (ref 6.4–8.2)
RBC # BLD AUTO: 4.41 10X6/UL (ref 4.2–6.1)
SODIUM SERPL-SCNC: 137 MMOL/L (ref 136–145)
WBC # BLD AUTO: 10.2 10X3/UL (ref 4.8–10.8)

## 2018-05-15 ENCOUNTER — HOSPITAL ENCOUNTER (OUTPATIENT)
Dept: HOSPITAL 84 - D.LAB | Age: 73
Discharge: HOME | End: 2018-05-15
Attending: ORTHOPAEDIC SURGERY
Payer: OTHER GOVERNMENT

## 2018-05-15 VITALS — BODY MASS INDEX: 30.7 KG/M2

## 2018-05-15 DIAGNOSIS — E11.9: Primary | ICD-10-CM

## 2018-05-15 LAB
ALBUMIN SERPL-MCNC: 3.8 G/DL (ref 3.4–5)
ALP SERPL-CCNC: 67 U/L (ref 46–116)
ALT SERPL-CCNC: 43 U/L (ref 10–68)
ANION GAP SERPL CALC-SCNC: 14.2 MMOL/L (ref 8–16)
APPEARANCE UR: CLEAR
BACTERIA #/AREA URNS HPF: (no result) /HPF
BILIRUB SERPL-MCNC: 0.51 MG/DL (ref 0.2–1.3)
BILIRUB SERPL-MCNC: NEGATIVE MG/DL
BUN SERPL-MCNC: 13 MG/DL (ref 7–18)
CALCIUM SERPL-MCNC: 9 MG/DL (ref 8.5–10.1)
CHLORIDE SERPL-SCNC: 105 MMOL/L (ref 98–107)
CO2 SERPL-SCNC: 25.9 MMOL/L (ref 21–32)
COLOR UR: YELLOW
CREAT SERPL-MCNC: 0.8 MG/DL (ref 0.6–1.3)
GLOBULIN SER-MCNC: 3.2 G/L
GLUCOSE SERPL-MCNC: 150 MG/DL (ref 74–106)
GLUCOSE SERPL-MCNC: NEGATIVE MG/DL
KETONES UR STRIP-MCNC: NEGATIVE MG/DL
MUCOUS THREADS #/AREA URNS LPF: (no result) /LPF
NITRITE UR-MCNC: NEGATIVE MG/ML
OSMOLALITY SERPL CALC.SUM OF ELEC: 283 MOSM/KG (ref 275–300)
PH UR STRIP: 5 [PH] (ref 5–6)
POTASSIUM SERPL-SCNC: 4.1 MMOL/L (ref 3.5–5.1)
PROT SERPL-MCNC: 7 G/DL (ref 6.4–8.2)
PROT UR-MCNC: (no result) MG/DL
SODIUM SERPL-SCNC: 141 MMOL/L (ref 136–145)
SP GR UR STRIP: 1.02 (ref 1–1.02)
SQUAMOUS #/AREA URNS HPF: (no result) /HPF (ref 0–5)
UROBILINOGEN UR-MCNC: NORMAL MG/DL
WBC #/AREA URNS HPF: (no result) /HPF (ref 0–5)

## 2019-01-18 ENCOUNTER — HOSPITAL ENCOUNTER (OUTPATIENT)
Dept: HOSPITAL 84 - D.US | Age: 74
Discharge: HOME | End: 2019-01-18
Attending: THORACIC SURGERY (CARDIOTHORACIC VASCULAR SURGERY)
Payer: MEDICARE

## 2019-01-18 VITALS — BODY MASS INDEX: 30.7 KG/M2

## 2019-01-18 DIAGNOSIS — I65.23: Primary | ICD-10-CM

## 2020-03-02 ENCOUNTER — HOSPITAL ENCOUNTER (OUTPATIENT)
Dept: HOSPITAL 84 - D.US | Age: 75
Discharge: HOME | End: 2020-03-02
Attending: THORACIC SURGERY (CARDIOTHORACIC VASCULAR SURGERY)
Payer: MEDICARE

## 2020-03-02 VITALS — BODY MASS INDEX: 30.7 KG/M2

## 2020-03-02 DIAGNOSIS — I65.29: Primary | ICD-10-CM
